# Patient Record
Sex: MALE
[De-identification: names, ages, dates, MRNs, and addresses within clinical notes are randomized per-mention and may not be internally consistent; named-entity substitution may affect disease eponyms.]

---

## 2023-03-22 PROBLEM — Z00.00 ENCOUNTER FOR PREVENTIVE HEALTH EXAMINATION: Status: ACTIVE | Noted: 2023-03-22

## 2023-06-08 ENCOUNTER — APPOINTMENT (OUTPATIENT)
Dept: NEUROSURGERY | Facility: CLINIC | Age: 23
End: 2023-06-08

## 2023-06-08 ENCOUNTER — APPOINTMENT (OUTPATIENT)
Dept: NEUROSURGERY | Facility: CLINIC | Age: 23
End: 2023-06-08
Payer: SELF-PAY

## 2023-06-08 ENCOUNTER — TRANSCRIPTION ENCOUNTER (OUTPATIENT)
Age: 23
End: 2023-06-08

## 2023-06-08 DIAGNOSIS — H93.A9 PULSATILE TINNITUS, UNSPECIFIED EAR: ICD-10-CM

## 2023-06-08 DIAGNOSIS — K44.9 DIAPHRAGMATIC HERNIA W/OUT OBSTRUCTION OR GANGRENE: ICD-10-CM

## 2023-06-08 DIAGNOSIS — I48.91 UNSPECIFIED ATRIAL FIBRILLATION: ICD-10-CM

## 2023-06-08 PROCEDURE — EDU01: CPT

## 2023-06-15 NOTE — HISTORY OF PRESENT ILLNESS
[de-identified] : Mr. WEBBER is a pleasant 23 year old male who presents today with a chief complaint of LEFT ear pulsatile tinnitus.  It first started It 1/2023 when his hiatal hernia symptoms started to flare up.  It is described as a constant, whooshing sound that is in sync with his pulse.  Compression both jugular veins at the same time stops the sound; no change with only one side or the other.  Bending over also improves the sound by 90%.  \par \par He has been seen by ENT the past and had a normal hearing test as per patient.  \par \par He denies any headaches, blurry vision or diplopia.\par \par Of note, he states he recently had a brief episode of Afib and was started on Cardizem.

## 2023-06-15 NOTE — REASON FOR VISIT
[Home] : at home, [unfilled] , at the time of the educational consult. [Medical Office: ([facility])___] : at the medical office located in  [Participant] : the participant [Self] : self [New Patient Visit] : a new patient visit [FreeTextEntry1] : Pulsatile Tinnitus

## 2023-06-15 NOTE — ASSESSMENT
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 This is a sequel of chronic severe stenosis at the junction of the transverse and sigmoid venous sinuses. We discussed the natural history of intracranial venous aneurysms and I explained to the patient that these aneurysms DO NOT cause intracranial hemorrhage or stroke.  While this is a known cause of pulsatile tinnitus, I would expect it to resolved with ipsilateral neck pressure.   \par \par The pulsatile tinnitus is severe and has a negative impact in social life, work and daily living. We discussed treatment options which include observation, trial of Diamox, endovascular treatment, open surgery.\par \par Treatment includes stent -assisted embolization of the wide-neck venous aneurysm of the proximal sigmoid venous sinus. We discussed with the patient my extensive personal experience with this treatment and the results of a recent clinical trial (Francesco et al, Interventional Neuroradiology 2020). We discussed the procedure that has two components. The first part consists of catheter angiogram and manometry to assess the degree of stenosis and confirm the presence of the venous aneurysm. This part is typically performed with the patient awake. The second part consists of embolization of the venous aneurysm utilizing stent, coils or both and is performed under general anesthesia.\par \par PLAN\par Follow Up with Me in 2-3 Months for further discussion regarding Catheter Angiogram/Venogram/Balloon Test Occlusion/ Possible Embolization of wide-neck venous aneurysm\par Will need GI clearance for DAPT (hiatal hernia)\par Will need cardiac clearance (afib)

## 2023-10-02 ENCOUNTER — TRANSCRIPTION ENCOUNTER (OUTPATIENT)
Age: 23
End: 2023-10-02

## 2023-10-03 ENCOUNTER — APPOINTMENT (OUTPATIENT)
Dept: NEUROSURGERY | Facility: CLINIC | Age: 23
End: 2023-10-03